# Patient Record
Sex: FEMALE | Race: WHITE | Employment: FULL TIME | URBAN - METROPOLITAN AREA
[De-identification: names, ages, dates, MRNs, and addresses within clinical notes are randomized per-mention and may not be internally consistent; named-entity substitution may affect disease eponyms.]

---

## 2021-12-26 ENCOUNTER — HOSPITAL ENCOUNTER (EMERGENCY)
Facility: CLINIC | Age: 26
Discharge: HOME | End: 2021-12-26
Attending: EMERGENCY MEDICINE | Admitting: EMERGENCY MEDICINE
Payer: COMMERCIAL

## 2021-12-26 VITALS
BODY MASS INDEX: 24.84 KG/M2 | RESPIRATION RATE: 14 BRPM | HEART RATE: 79 BPM | OXYGEN SATURATION: 98 % | TEMPERATURE: 98.7 F | DIASTOLIC BLOOD PRESSURE: 78 MMHG | WEIGHT: 135 LBS | HEIGHT: 62 IN | SYSTOLIC BLOOD PRESSURE: 129 MMHG

## 2021-12-26 DIAGNOSIS — K92.1 HEMATOCHEZIA: ICD-10-CM

## 2021-12-26 LAB
ANION GAP SERPL CALCULATED.3IONS-SCNC: 12 MMOL/L (ref 5–18)
BASOPHILS # BLD AUTO: 0 10E3/UL (ref 0–0.2)
BASOPHILS NFR BLD AUTO: 0 %
BUN SERPL-MCNC: 10 MG/DL (ref 8–22)
CALCIUM SERPL-MCNC: 9.2 MG/DL (ref 8.5–10.5)
CHLORIDE BLD-SCNC: 108 MMOL/L (ref 98–107)
CO2 SERPL-SCNC: 17 MMOL/L (ref 22–31)
CREAT SERPL-MCNC: 0.78 MG/DL (ref 0.6–1.1)
EOSINOPHIL # BLD AUTO: 0.1 10E3/UL (ref 0–0.7)
EOSINOPHIL NFR BLD AUTO: 2 %
ERYTHROCYTE [DISTWIDTH] IN BLOOD BY AUTOMATED COUNT: 12 % (ref 10–15)
GFR SERPL CREATININE-BSD FRML MDRD: >90 ML/MIN/1.73M2
GLUCOSE BLD-MCNC: 95 MG/DL (ref 70–125)
HCT VFR BLD AUTO: 38.5 % (ref 35–47)
HGB BLD-MCNC: 12.9 G/DL (ref 11.7–15.7)
IMM GRANULOCYTES # BLD: 0 10E3/UL
IMM GRANULOCYTES NFR BLD: 0 %
LYMPHOCYTES # BLD AUTO: 2.1 10E3/UL (ref 0.8–5.3)
LYMPHOCYTES NFR BLD AUTO: 28 %
MCH RBC QN AUTO: 28.7 PG (ref 26.5–33)
MCHC RBC AUTO-ENTMCNC: 33.5 G/DL (ref 31.5–36.5)
MCV RBC AUTO: 86 FL (ref 78–100)
MONOCYTES # BLD AUTO: 0.5 10E3/UL (ref 0–1.3)
MONOCYTES NFR BLD AUTO: 7 %
NEUTROPHILS # BLD AUTO: 4.8 10E3/UL (ref 1.6–8.3)
NEUTROPHILS NFR BLD AUTO: 63 %
NRBC # BLD AUTO: 0 10E3/UL
NRBC BLD AUTO-RTO: 0 /100
PLATELET # BLD AUTO: 234 10E3/UL (ref 150–450)
POTASSIUM BLD-SCNC: 3.6 MMOL/L (ref 3.5–5)
RBC # BLD AUTO: 4.49 10E6/UL (ref 3.8–5.2)
SODIUM SERPL-SCNC: 137 MMOL/L (ref 136–145)
WBC # BLD AUTO: 7.6 10E3/UL (ref 4–11)

## 2021-12-26 PROCEDURE — 80048 BASIC METABOLIC PNL TOTAL CA: CPT | Performed by: EMERGENCY MEDICINE

## 2021-12-26 PROCEDURE — 85025 COMPLETE CBC W/AUTO DIFF WBC: CPT | Performed by: EMERGENCY MEDICINE

## 2021-12-26 PROCEDURE — 36415 COLL VENOUS BLD VENIPUNCTURE: CPT | Performed by: EMERGENCY MEDICINE

## 2021-12-26 PROCEDURE — 99283 EMERGENCY DEPT VISIT LOW MDM: CPT

## 2021-12-26 RX ORDER — NORETHINDRONE ACETATE AND ETHINYL ESTRADIOL 1MG-20(21)
KIT ORAL
COMMUNITY
Start: 2021-09-06

## 2021-12-26 RX ORDER — PRAZIQUANTEL 600 MG/1
600 TABLET, FILM COATED ORAL ONCE
Qty: 1 TABLET | Refills: 0 | Status: SHIPPED | OUTPATIENT
Start: 2021-12-26 | End: 2021-12-26

## 2021-12-26 RX ORDER — PRAZIQUANTEL 600 MG/1
600 TABLET, FILM COATED ORAL ONCE
Status: DISCONTINUED | OUTPATIENT
Start: 2021-12-26 | End: 2021-12-26

## 2021-12-26 ASSESSMENT — MIFFLIN-ST. JEOR: SCORE: 1315.61

## 2021-12-26 NOTE — DISCHARGE INSTRUCTIONS
Follow-up with your primary care provider within the next week for a recheck  Return to the emergency department for worsening problems or concerns  Tylenol 650 mg every 4 hours as needed for pain

## 2021-12-26 NOTE — ED PROVIDER NOTES
EMERGENCY DEPARTMENT ENCOUnter      NAME: Michell Naidu  AGE: 26 year old female  YOB: 1995  MRN: [id number]  EVALUATION DATE & TIME: No admission date for patient encounter.    PCP: No primary care provider on file.    ED PROVIDER: Ai Umana MD      Chief Complaint   Patient presents with     PARASITE     Rectal Bleeding         FINAL IMPRESSION:  1. Hematochezia          ED COURSE & MEDICAL DECISION MAKING:      The patient is a 26-year-old female that presents to the emergency department for evaluation of rectal bleeding and a possible parasite.  The patient's dog was recently diagnosed with a tapeworm.  The dog sleeps in her bed and she thinks she may have also contracted a tapeworm.  We will treat for a possible parasitic infection and recommended close outpatient follow-up.  Hemoglobin and vital signs are reassuring, hence I do not think she has a significant GI bleed.    4:39 AM I met with patient to gather history and perform an initial exam. Plans for ED stay discussed. Previous medical records reviewed. PPE includes N95, goggles, and gloves.       At the conclusion of the encounter I discussed the results of all of the tests and the disposition. The questions were answered. The patient or family acknowledged understanding and was agreeable with the care plan.         MEDICATIONS GIVEN IN THE EMERGENCY:  Medications - No data to display    NEW PRESCRIPTIONS STARTED AT TODAY'S ER VISIT  New Prescriptions    PRAZIQUANTEL (BILTRICIDE) 600 MG TABLET    Take 1 tablet (600 mg) by mouth once for 1 dose          =================================================================    HPI        Michell Naidu is a 26 year old female with no pertinent medical history who presents to this ED via private car accompanied by self for evaluation of tapeworm in stool.    Patient reports her puppy was diagnosed with tapeworm 4 days ago and she is concerned she may have gotten it. She  "had 3 loose bowel movements tonight and reports there was \"a lot\" of red blood in them, described as similar to having a menstrual cycle. In her last bowel movement, she noticed what she thought was a tapeworm. She endorses dull, intermittent lower abdominal cramping since 5 PM last night (about 12 hours ago). One week ago, she had general malaise and diarrhea, but there was no blood in it at that time. Denies recent fever, nausea, vomiting, or any other complaints at this time.    Patient takes birth control and an anxiety medication daily. She works as a .Denies tobacco use. Endorses occasional alcohol use.     REVIEW OF SYSTEMS     Constitutional:  Denies fever or chills  HENT:  Denies sore throat   Respiratory:  Denies cough or shortness of breath   Cardiovascular:  Denies chest pain or palpitations  GI:  Endorses lower abdominal cramping (12 hours). Endorses bloody loose stools (3 episodes, red blood, \"tapeworm\" seen) . Denies nausea, or vomiting  Musculoskeletal:  Denies any new extremity pain   Skin:  Denies rash   Neurologic:  Denies headache, focal weakness or sensory changes    All other systems reviewed and are negative      PAST MEDICAL HISTORY:  Reviewed and nothing pertinent.     PAST SURGICAL HISTORY:  Reviewed and nothing pertinent.      CURRENT MEDICATIONS:    norethindrone-ethinyl estradiol (AUROVELA FE 1/20) 1-20 MG-MCG tablet  praziquantel (BILTRICIDE) 600 MG tablet  sertraline (ZOLOFT) 50 MG tablet        ALLERGIES:  Allergies   Allergen Reactions     Penicillins Itching and Rash       FAMILY HISTORY:  No family history on file.    SOCIAL HISTORY:   Patient is a non-smoker.  Endorses occasional alcohol use.     VITALS:  Patient Vitals for the past 24 hrs:   BP Temp Temp src Pulse Resp SpO2 Height Weight   12/26/21 0425 (!) 142/87 98.7  F (37.1  C) Oral 76 15 98 % 1.575 m (5' 2\") 61.2 kg (135 lb)       PHYSICAL EXAM    Constitutional:  Well developed, Well nourished,  HENT:  " Normocephalic, Atraumatic, Bilateral external ears normal,   Neck:  Normal range of motion, No meningismus, No stridor.   Eyes:  EOMI, Conjunctiva normal, No discharge.   Respiratory:  Normal breath sounds, No respiratory distress, No wheezing, No chest tenderness.   Cardiovascular:  Normal heart rate, Normal rhythm, No murmurs  GI:  Soft, No tenderness, No guarding, No CVA tenderness.   Musculoskeletal:  Neurovascularly intact distally, No edema, No tenderness, No cyanosis, Good range of motion in all major joints. No tenderness to palpation or major deformities noted.   Integument:  Warm, Dry, No erythema, No rash.   Lymphatic:  No lymphadenopathy noted.   Neurologic:  Alert & oriented x 3, Normal motor function,  No focal deficits noted.   Psychiatric:  Affect normal, Judgment normal, Mood normal.      LAB:  All pertinent labs reviewed and interpreted.  Results for orders placed or performed during the hospital encounter of 12/26/21   Basic metabolic panel   Result Value Ref Range    Sodium 137 136 - 145 mmol/L    Potassium 3.6 3.5 - 5.0 mmol/L    Chloride 108 (H) 98 - 107 mmol/L    Carbon Dioxide (CO2) 17 (L) 22 - 31 mmol/L    Anion Gap 12 5 - 18 mmol/L    Urea Nitrogen 10 8 - 22 mg/dL    Creatinine 0.78 0.60 - 1.10 mg/dL    Calcium 9.2 8.5 - 10.5 mg/dL    Glucose 95 70 - 125 mg/dL    GFR Estimate >90 >60 mL/min/1.73m2   CBC with platelets and differential   Result Value Ref Range    WBC Count 7.6 4.0 - 11.0 10e3/uL    RBC Count 4.49 3.80 - 5.20 10e6/uL    Hemoglobin 12.9 11.7 - 15.7 g/dL    Hematocrit 38.5 35.0 - 47.0 %    MCV 86 78 - 100 fL    MCH 28.7 26.5 - 33.0 pg    MCHC 33.5 31.5 - 36.5 g/dL    RDW 12.0 10.0 - 15.0 %    Platelet Count 234 150 - 450 10e3/uL    % Neutrophils 63 %    % Lymphocytes 28 %    % Monocytes 7 %    % Eosinophils 2 %    % Basophils 0 %    % Immature Granulocytes 0 %    NRBCs per 100 WBC 0 <1 /100    Absolute Neutrophils 4.8 1.6 - 8.3 10e3/uL    Absolute Lymphocytes 2.1 0.8 - 5.3  10e3/uL    Absolute Monocytes 0.5 0.0 - 1.3 10e3/uL    Absolute Eosinophils 0.1 0.0 - 0.7 10e3/uL    Absolute Basophils 0.0 0.0 - 0.2 10e3/uL    Absolute Immature Granulocytes 0.0 <=0.4 10e3/uL    Absolute NRBCs 0.0 10e3/uL               I, Eleanor Mackey, am serving as a scribe to document services personally performed by Dr. Umana based on my observation and the provider's statements to me. I, Ai Umana MD attest that Eleanor Mackey is acting in a scribe capacity, has observed my performance of the services and has documented them in accordance with my direction.    Ai Umana MD  Emergency Medicine  [facility] EMERGENCY ROOM  [address] [facility] [id number] [phone]  Dept: [phone]       Ai Umana MD  12/26/21 0516

## 2021-12-26 NOTE — ED TRIAGE NOTES
Pt reports that she has a puppy that was known to have tapeworm when she got it. Now having blood in stool and thinks she saw a tapeworm in her most recent stool.